# Patient Record
Sex: MALE
[De-identification: names, ages, dates, MRNs, and addresses within clinical notes are randomized per-mention and may not be internally consistent; named-entity substitution may affect disease eponyms.]

---

## 2019-11-11 ENCOUNTER — APPOINTMENT (OUTPATIENT)
Dept: ORTHOPEDIC SURGERY | Facility: CLINIC | Age: 59
End: 2019-11-11
Payer: COMMERCIAL

## 2019-11-11 VITALS
SYSTOLIC BLOOD PRESSURE: 130 MMHG | TEMPERATURE: 98 F | BODY MASS INDEX: 27.17 KG/M2 | HEART RATE: 101 BPM | HEIGHT: 73 IN | DIASTOLIC BLOOD PRESSURE: 84 MMHG | WEIGHT: 205 LBS

## 2019-11-11 DIAGNOSIS — Z78.9 OTHER SPECIFIED HEALTH STATUS: ICD-10-CM

## 2019-11-11 DIAGNOSIS — F17.200 NICOTINE DEPENDENCE, UNSPECIFIED, UNCOMPLICATED: ICD-10-CM

## 2019-11-11 DIAGNOSIS — M17.0 BILATERAL PRIMARY OSTEOARTHRITIS OF KNEE: ICD-10-CM

## 2019-11-11 DIAGNOSIS — M20.40 OTHER HAMMER TOE(S) (ACQUIRED), UNSPECIFIED FOOT: ICD-10-CM

## 2019-11-11 PROCEDURE — 73565 X-RAY EXAM OF KNEES: CPT

## 2019-11-11 PROCEDURE — 73560 X-RAY EXAM OF KNEE 1 OR 2: CPT | Mod: 50

## 2019-11-11 PROCEDURE — 99203 OFFICE O/P NEW LOW 30 MIN: CPT

## 2019-11-14 NOTE — DISCUSSION/SUMMARY
[de-identified] : At this time, due to osteoarthritis of bilateral knees, I recommended he undergo a course of physical therapy and anti-inflammatories.  He will be reassessed in four to six weeks.

## 2019-11-14 NOTE — HISTORY OF PRESENT ILLNESS
[de-identified] : The patient comes in today with complaints of pain to bilateral knees.  It has been going on for the last several months and getting worse recently.  He is very active.  He notes that after prolonged sitting or riding in a car, he has increasing complaints of pain and crunching.  The patient states the pain is intermittent.  The patient describes the pain as throbbing knee joints.  The patient notes rest makes his symptoms better, while walking makes his symptoms worse.  The patient indicates pain is at a level of 6 on a pain scale of 0-10.

## 2019-11-14 NOTE — ADDENDUM
[FreeTextEntry1] : This note was written by Varsha Perez on 11/13/2019 acting as a scribe for JEAN DU III, MD

## 2019-11-14 NOTE — PHYSICAL EXAM
[de-identified] : Right Knee: \par Range of Motion in Degrees	\par 	                  Claimant:	Normal:	\par Flexion Active	  135 	                135-degrees	\par Flexion Passive	  135	                135-degrees	\par Extension Active	  0-5	                0-5-degrees	\par Extension Passive	  0-5	                0-5-degrees	\par \par No weakness to flexion/extension.  No evidence of instability in the AP plane or varus or valgus stress.  Negative  Lachman.  Negative pivot shift.  Negative anterior drawer test.  Negative posterior drawer test.  Negative Jorgito.  Negative Apley grind.  No medial or lateral joint line tenderness.  Positive tenderness over the medial and lateral facet of the patella.  Positive patellofemoral crepitations.  No lateral tilting patella.  No patella apprehension.  Positive crepitation in the medial and lateral femoral condyle.  No proximal or distal swelling, edema or tenderness.  No gross motor or sensory deficits.  Mild intra-articular swelling.  2+ DP and PT pulses.  No varus or valgus malalignment.  Skin is intact.  No rashes, scars or lesions. \par \par Left Knee: \par Range of Motion in Degrees	\par 	                  Claimant:	Normal:	\par Flexion Active	  135 	                135-degrees	\par Flexion Passive	  135	                135-degrees	\par Extension Active	  0-5	                0-5-degrees	\par Extension Passive	  0-5	                0-5-degrees	\par \par No weakness to flexion/extension.  No evidence of instability in the AP plane or varus or valgus stress.  Negative  Lachman.  Negative pivot shift.  Negative anterior drawer test.  Negative posterior drawer test.  Negative Jorgito.  Negative Apley grind.  No medial or lateral joint line tenderness.  Positive tenderness over the medial and lateral facet of the patella.  Positive patellofemoral crepitations.  No lateral tilting patella.  No patella apprehension.  Positive crepitation in the medial and lateral femoral condyle.  No proximal or distal swelling, edema or tenderness.  No gross motor or sensory deficits.  Mild intra-articular swelling.  2+ DP and PT pulses.  No varus or valgus malalignment.  Skin is intact.  No rashes, scars or lesions.\par  [de-identified] : Ambulating with a slightly antalgic to antalgic gait.  Station:  Normal.  [de-identified] : Appearance:  Well-developed, well-nourished male in no acute distress.\par   [de-identified] : Radiographs, one to two views of the right knee, one to two views of the left knee and one view AP Standing, show moderate degenerative changes.

## 2023-01-23 ENCOUNTER — APPOINTMENT (OUTPATIENT)
Dept: OTOLARYNGOLOGY | Facility: CLINIC | Age: 63
End: 2023-01-23
Payer: COMMERCIAL

## 2023-01-23 VITALS — WEIGHT: 200 LBS | TEMPERATURE: 97.2 F | BODY MASS INDEX: 26.51 KG/M2 | HEIGHT: 73 IN

## 2023-01-23 PROCEDURE — 92567 TYMPANOMETRY: CPT

## 2023-01-23 PROCEDURE — 92557 COMPREHENSIVE HEARING TEST: CPT

## 2023-01-23 PROCEDURE — 99204 OFFICE O/P NEW MOD 45 MIN: CPT

## 2023-01-23 RX ORDER — CEFUROXIME AXETIL 500 MG/1
500 TABLET ORAL
Qty: 20 | Refills: 0 | Status: ACTIVE | COMMUNITY
Start: 2023-01-07

## 2023-01-23 RX ORDER — NEOMYCIN AND POLYMYXIN B SULFATES AND HYDROCORTISONE OTIC 10; 3.5; 1 MG/ML; MG/ML; [USP'U]/ML
3.5-10000-1 SUSPENSION AURICULAR (OTIC)
Qty: 10 | Refills: 0 | Status: ACTIVE | COMMUNITY
Start: 2022-12-19

## 2023-01-23 NOTE — PHYSICAL EXAM
[Midline] : trachea located in midline position [Nasal Endoscopy Performed] : nasal endoscopy was performed, see procedure section for findings [] : septum deviated to the left [Normal] : external ears are normal bilaterally [de-identified] : 512 tf to right

## 2023-01-23 NOTE — REASON FOR VISIT
[Initial Consultation] : an initial consultation for [Tinnitus] : tinnitus [Spouse] : spouse [FreeTextEntry2] : ears

## 2023-01-23 NOTE — ASSESSMENT
[FreeTextEntry1] : Patient with c/o of hearing loss right ear - noted worse for past few mos.   ? hx of sinus issues in past.  No evidence today of sinus problems and ear exam normal but 512 tf to right.  Audio shows normal tymp bilat and has right conductive loss.  In view of finding recommended ct of tb - will also get otology eval after - feel problem may be otosclerosis.

## 2023-01-23 NOTE — DATA REVIEWED
[de-identified] : Mild to moderate conductive loss right ear\par Moderate SNL at 250 Hz and mild loss 1010-6070 Hz, left ear \par Type A tymps

## 2023-01-23 NOTE — HISTORY OF PRESENT ILLNESS
[de-identified] : c/o clogged ears - hx of occ sinus pressure in past. About 2 mos ago had ear infection and treated with abx - Noted mostly in right ear.  Hears hum in right ear.  Not pulsatile.   ? cerumen in past.  Infections as child. ? did have drainage issue as child.

## 2023-02-15 ENCOUNTER — APPOINTMENT (OUTPATIENT)
Dept: CT IMAGING | Facility: CLINIC | Age: 63
End: 2023-02-15

## 2023-02-15 ENCOUNTER — OUTPATIENT (OUTPATIENT)
Dept: OUTPATIENT SERVICES | Facility: HOSPITAL | Age: 63
LOS: 1 days | End: 2023-02-15
Payer: COMMERCIAL

## 2023-02-15 DIAGNOSIS — H80.93 UNSPECIFIED OTOSCLEROSIS, BILATERAL: ICD-10-CM

## 2023-02-15 PROCEDURE — 70480 CT ORBIT/EAR/FOSSA W/O DYE: CPT

## 2023-02-15 PROCEDURE — 70480 CT ORBIT/EAR/FOSSA W/O DYE: CPT | Mod: 26

## 2023-02-22 RX ORDER — AMOXICILLIN AND CLAVULANATE POTASSIUM 875; 125 MG/1; MG/1
875-125 TABLET, COATED ORAL
Qty: 20 | Refills: 0 | Status: ACTIVE | COMMUNITY
Start: 2023-02-22 | End: 1900-01-01

## 2023-02-22 RX ORDER — FLUTICASONE PROPIONATE 50 UG/1
50 SPRAY, METERED NASAL DAILY
Qty: 1 | Refills: 2 | Status: ACTIVE | COMMUNITY
Start: 2023-02-22 | End: 1900-01-01

## 2023-02-22 RX ORDER — METHYLPREDNISOLONE 4 MG/1
4 TABLET ORAL
Qty: 1 | Refills: 0 | Status: ACTIVE | COMMUNITY
Start: 2023-02-22 | End: 1900-01-01

## 2023-03-01 ENCOUNTER — APPOINTMENT (OUTPATIENT)
Dept: OTOLARYNGOLOGY | Facility: CLINIC | Age: 63
End: 2023-03-01
Payer: COMMERCIAL

## 2023-03-01 DIAGNOSIS — J32.9 CHRONIC SINUSITIS, UNSPECIFIED: ICD-10-CM

## 2023-03-01 DIAGNOSIS — H61.22 IMPACTED CERUMEN, LEFT EAR: ICD-10-CM

## 2023-03-01 DIAGNOSIS — H90.3 SENSORINEURAL HEARING LOSS, BILATERAL: ICD-10-CM

## 2023-03-01 DIAGNOSIS — H80.93 UNSPECIFIED OTOSCLEROSIS, BILATERAL: ICD-10-CM

## 2023-03-01 DIAGNOSIS — H93.11 TINNITUS, RIGHT EAR: ICD-10-CM

## 2023-03-01 PROCEDURE — 31231 NASAL ENDOSCOPY DX: CPT

## 2023-03-01 PROCEDURE — 99214 OFFICE O/P EST MOD 30 MIN: CPT | Mod: 25

## 2023-03-01 PROCEDURE — G0268 REMOVAL OF IMPACTED WAX MD: CPT

## 2023-03-01 NOTE — HISTORY OF PRESENT ILLNESS
[de-identified] : 64 y/o M pt of Dr. Queen presents for evaluation of hearing loss and tinnitus (not pulsatile)\par reports having sinus infection during holiday season then noticed hearing loss and tinnitus bilaterally\par reports hx of sinusitis, and frequent sinus infections, had sinuses drained in the past\par Patient denies otalgia, otorrhea, ear infections, dizziness, vertigo, headaches related to hearing.

## 2023-03-01 NOTE — ASSESSMENT
[FreeTextEntry1] : Otoscopic exam today shows intact left tympanic membrane without effusion or retraction after cerumen removal.  Right ear with posterior retraction onto the head of stapes with erosion of the incus lenticular process and only thin fibrous band remaining.  No effusion, no cholesteatoma present.  Nasal endoscopy shows no gross intranasal polyps or purulence.  I personally reviewed and interpreted prior temporal bone CT images and report, which shows opacification of bilateral maxillary and ethmoid sinuses, underdeveloped right middle ear and mastoid but with no evidence of effusion or bony erosion.  I personally reviewed and interpreted an audiogram for his hearing loss, which shows a right mixed hearing loss with small air-bone gap in the mild to moderate range.  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  Monitor hearing and recheck exam in approximately 6 months to confirm no progression.  Counseled patient that he is a borderline candidate for hearing aid but not yet strong surgical candidate given air-bone gap is relatively small, and he expressed understanding.  Plans to follow-up with Dr. Orona for management of sinusitis, continue nasal steroid and antihistamines in the short-term.\par

## 2023-03-01 NOTE — PROCEDURE
[FreeTextEntry3] : Procedure note:  Left cerumenectomy\par \par Description of Procedure:  Cerumen impaction was noted requiring debridement under the operating microscope using otologic instrumentation.  The patient tolerated the procedure without complications.\par  [FreeTextEntry6] : Procedure note: Nasal endoscopy\par \par Description of Procedure:  Nasal endoscopy was performed because of recalcitrant symptoms of nasal obstruction and/or chronic rhinitis, and anterior anatomic abnormalities precluding visualization.  Using a flexible endoscope with sheath, the nasal mucosa, septum, turbinates, and ostiomeatal complex were examined.  \par \par Nasal mucosa findings:  the nasal mucosa was mildly edematous.\par Septum findings:  the septum showed no abnormalities.\par Nasopharynx findings: Nasopharynx was clear\par Middle meatus findings:  the middle meatus had no abnormalities.\par Sinuses findings:  the paranasal sinuses had no abnormalities.\par

## 2023-03-22 ENCOUNTER — APPOINTMENT (OUTPATIENT)
Dept: OTOLARYNGOLOGY | Facility: CLINIC | Age: 63
End: 2023-03-22
Payer: COMMERCIAL

## 2023-03-22 VITALS — WEIGHT: 210 LBS | BODY MASS INDEX: 27.83 KG/M2 | HEIGHT: 73 IN | TEMPERATURE: 98 F

## 2023-03-22 DIAGNOSIS — J32.0 CHRONIC MAXILLARY SINUSITIS: ICD-10-CM

## 2023-03-22 DIAGNOSIS — H90.A31 MIXED CONDUCTIVE AND SENSORINEURAL HEARING LOSS, UNILATERAL, RIGHT EAR WITH RESTRICTED HEARING ON THE  CONTRALATERAL SIDE: ICD-10-CM

## 2023-03-22 DIAGNOSIS — J34.3 HYPERTROPHY OF NASAL TURBINATES: ICD-10-CM

## 2023-03-22 PROCEDURE — 31231 NASAL ENDOSCOPY DX: CPT

## 2023-03-22 PROCEDURE — 99214 OFFICE O/P EST MOD 30 MIN: CPT | Mod: 25

## 2023-03-22 RX ORDER — FLUTICASONE PROPIONATE 50 UG/1
50 SPRAY, METERED NASAL DAILY
Qty: 1 | Refills: 2 | Status: ACTIVE | COMMUNITY
Start: 2023-03-22 | End: 1900-01-01

## 2023-03-22 NOTE — HISTORY OF PRESENT ILLNESS
[de-identified] : Patient here for follow up of sinus issue.  Did have initial hearing issue but had imaging for ear and noted to have sinusitis.  Now doing much better since abx and flonase helping a lot.  Hx of allergic issues in past \par Did see Dr Bernstein and is being followed for his hearing loss with Dr. Bernstein.

## 2023-03-22 NOTE — PHYSICAL EXAM
[Nasal Endoscopy Performed] : nasal endoscopy was performed, see procedure section for findings [] : septum deviated to the left [Midline] : trachea located in midline position [Normal] : no rashes [de-identified] : 512 tf to right  [de-identified] : mod inf turb hypertorphy

## 2023-03-22 NOTE — ASSESSMENT
[FreeTextEntry1] : Patient here for follow up of sinus issue.  Did have initial hearing issue but had imaging for ear and noted to have sinusitis.  Now doing much better since abx and flonase helping a lot.  Hx of allergic issues in past \par Did see Dr Bernstein and is being followed for his hearing loss with Dr. Bernstein.  \par Recommended flonase and also discussed allergy eval.  No evidence of sinusitis today  - if issues persist may need repeat imaging and possible rhinology eval.

## 2023-09-06 ENCOUNTER — APPOINTMENT (OUTPATIENT)
Dept: OTOLARYNGOLOGY | Facility: CLINIC | Age: 63
End: 2023-09-06

## 2023-09-26 ENCOUNTER — APPOINTMENT (OUTPATIENT)
Dept: OTOLARYNGOLOGY | Facility: CLINIC | Age: 63
End: 2023-09-26